# Patient Record
Sex: MALE | Race: WHITE | NOT HISPANIC OR LATINO | Employment: FULL TIME | URBAN - METROPOLITAN AREA
[De-identification: names, ages, dates, MRNs, and addresses within clinical notes are randomized per-mention and may not be internally consistent; named-entity substitution may affect disease eponyms.]

---

## 2017-12-26 ENCOUNTER — OFFICE VISIT (OUTPATIENT)
Dept: URGENT CARE | Facility: MEDICAL CENTER | Age: 32
End: 2017-12-26
Payer: COMMERCIAL

## 2017-12-26 PROCEDURE — 99213 OFFICE O/P EST LOW 20 MIN: CPT

## 2017-12-27 NOTE — PROGRESS NOTES
Assessment   1  Cough (786 2) (R05)   2  Allergic rhinitis (477 9) (J30 9)    Plan   Allergic rhinitis    · Benzonatate 100 MG Oral Capsule; TAKE 1 CAPSULE 2-3 TIMES DAILY AS    DIRECTED   · Fluticasone Propionate 50 MCG/ACT Nasal Suspension; USE 1 SPRAY IN EACH    NOSTRIL ONCE DAILY    Discussion/Summary   Discussion Summary:    Use flonase and tesslon pearles for your symptoms  antihistamine as discussed  to your PCP for any persistent high fevers or productive cough  to the ER for any distress  Understands and agrees with treatment plan: The treatment plan was reviewed with the patient/guardian  The patient/guardian understands and agrees with the treatment plan      Chief Complaint   Chief Complaint Free Text Note Form: 4-5 weeks with cough, seems to go away and then comes back  dry cough worse at night  History of Present Illness   HPI: This is a 34year old male presenting for cough x 1 month  He had a URI about 4 weeks ago and is better but still has a cough  The cough is dry, worse at night, and causing a sore throat at times  He denies any fevers, chills, SOB  He has not taken anything at home for his cough  No history of smoking or lung issues  Hospital Based Practices Required Assessment:      Pain Assessment      the patient states they do not have pain  Abuse And Domestic Violence Screen       Yes, the patient is safe at home  -- The patient states no one is hurting them  Depression And Suicide Screen  No, the patient has not had thoughts of hurting themself  No, the patient has not felt depressed in the past 7 days  Primary Language is  English  Readiness To Learn: Receptive  Barriers To Learning: none        Preferred Learning: verbal      Education Completed: disease/condition-- and-- treatment/procedure      Teaching Method: verbal      Person Taught: patient      Evaluation Of Learning: verbalized/demonstrated understanding      Review of Systems Focused-Male:      Constitutional: no fever or chills, feels well, no tiredness, no recent weight loss or weight gain  ENT: sore throat, but-- no earache,-- no nosebleeds,-- no nasal discharge-- and-- no hoarseness  Cardiovascular: no complaints of slow or fast heart rate, no chest pain, no palpitations, no leg claudication or lower extremity edema  Respiratory: cough-- and-- PND, but-- no shortness of breath,-- no orthopnea,-- no wheezing-- and-- no shortness of breath during exertion  Gastrointestinal: no complaints of abdominal pain, no constipation, no nausea or vomiting, no diarrhea or bloody stools  Social History    · Occasional alcohol consumption (V49 89) (Z78 9)    Current Meds    1  No Reported Medications Recorded    Allergies   1  No Known Drug Allergies    Vitals   Signs   Recorded: [id number] 09:01AM   Temperature: 98 6 F  Heart Rate: 70  Respiration: 20  Systolic: 055  Diastolic: 80  Height: 5 ft 10 in  Weight: 221 lb 6 4 oz  BMI Calculated: 31 77  BSA Calculated: 2 18  O2 Saturation: 99    Physical Exam        Constitutional      General appearance: No acute distress, well appearing and well nourished  Eyes      Conjunctiva and lids: No swelling, erythema, or discharge  Pupils and irises: Equal, round and reactive to light  Ears, Nose, Mouth, and Throat      External inspection of ears and nose: Normal        Otoscopic examination: Tympanic membrance translucent with normal light reflex  Canals patent without erythema  Nasal mucosa, septum, and turbinates: Normal without edema or erythema  Oropharynx: Normal with no erythema, edema, exudate or lesions  Pulmonary      Respiratory effort: No increased work of breathing or signs of respiratory distress  Auscultation of lungs: Clear to auscultation  Cardiovascular      Auscultation of heart: Normal rate and rhythm, normal S1 and S2, without murmurs         Psychiatric Orientation to person, place and time: Normal        Mood and affect: Normal        Signatures    Electronically signed by : ELIZABETH Henson; Dec 26 2017  9:17AM EST                       (Author)     Electronically signed by : YE Adorno ; Dec 26 2017  9:45AM EST                       (Co-author)

## 2018-01-23 VITALS
WEIGHT: 221.4 LBS | SYSTOLIC BLOOD PRESSURE: 130 MMHG | DIASTOLIC BLOOD PRESSURE: 80 MMHG | HEART RATE: 70 BPM | TEMPERATURE: 98.6 F | HEIGHT: 70 IN | BODY MASS INDEX: 31.7 KG/M2 | RESPIRATION RATE: 20 BRPM | OXYGEN SATURATION: 99 %

## 2024-09-11 ENCOUNTER — APPOINTMENT (OUTPATIENT)
Dept: LAB | Facility: MEDICAL CENTER | Age: 39
End: 2024-09-11
Payer: COMMERCIAL

## 2024-09-11 DIAGNOSIS — E55.9 VITAMIN D DEFICIENCY: ICD-10-CM

## 2024-09-11 DIAGNOSIS — R53.83 OTHER FATIGUE: ICD-10-CM

## 2024-09-11 DIAGNOSIS — N52.9 IMPOTENCE OF ORGANIC ORIGIN: ICD-10-CM

## 2024-09-11 LAB
ANA SER QL IA: NEGATIVE
B BURGDOR IGG+IGM SER QL IA: NEGATIVE
IRON SATN MFR SERPL: 35 % (ref 15–50)
IRON SERPL-MCNC: 143 UG/DL (ref 50–212)
T3FREE SERPL-MCNC: 3.93 PG/ML (ref 2.5–3.9)
TIBC SERPL-MCNC: 407 UG/DL (ref 250–450)
TSH SERPL DL<=0.05 MIU/L-ACNC: 2.64 UIU/ML (ref 0.45–4.5)
UIBC SERPL-MCNC: 264 UG/DL (ref 155–355)

## 2024-09-11 PROCEDURE — 36415 COLL VENOUS BLD VENIPUNCTURE: CPT

## 2024-09-11 PROCEDURE — 86038 ANTINUCLEAR ANTIBODIES: CPT

## 2024-09-11 PROCEDURE — 84270 ASSAY OF SEX HORMONE GLOBUL: CPT

## 2024-09-11 PROCEDURE — 84481 FREE ASSAY (FT-3): CPT

## 2024-09-11 PROCEDURE — 86618 LYME DISEASE ANTIBODY: CPT

## 2024-09-11 PROCEDURE — 83540 ASSAY OF IRON: CPT

## 2024-09-11 PROCEDURE — 84443 ASSAY THYROID STIM HORMONE: CPT

## 2024-09-11 PROCEDURE — 83550 IRON BINDING TEST: CPT

## 2024-09-11 PROCEDURE — 84410 TESTOSTERONE BIOAVAILABLE: CPT

## 2024-09-20 LAB
SHBG SERPL-SCNC: 26.5 NMOL/L
TESTOST SERPL-MCNC: 196 NG/DL
TESTOSTERONE.FREE+WB MFR SERPL: 43.5 %
TESTOSTERONE.FREE+WB SERPL-MCNC: 85 NG/DL

## 2024-11-01 ENCOUNTER — APPOINTMENT (OUTPATIENT)
Dept: LAB | Facility: MEDICAL CENTER | Age: 39
End: 2024-11-01
Payer: COMMERCIAL

## 2024-11-01 DIAGNOSIS — R79.89 LOW TESTOSTERONE IN MALE: ICD-10-CM

## 2024-11-01 LAB
LH SERPL-ACNC: 4.6 MIU/ML
PROLACTIN SERPL-MCNC: 8.42 NG/ML
PSA SERPL-MCNC: 0.5 NG/ML (ref 0–4)
TESTOST SERPL-MSCNC: 269 NG/DL

## 2024-11-01 PROCEDURE — 83002 ASSAY OF GONADOTROPIN (LH): CPT

## 2024-11-01 PROCEDURE — 84403 ASSAY OF TOTAL TESTOSTERONE: CPT

## 2024-11-01 PROCEDURE — 36415 COLL VENOUS BLD VENIPUNCTURE: CPT

## 2024-11-01 PROCEDURE — 84146 ASSAY OF PROLACTIN: CPT

## 2024-11-01 PROCEDURE — G0103 PSA SCREENING: HCPCS
